# Patient Record
Sex: MALE | Race: WHITE | NOT HISPANIC OR LATINO | Employment: FULL TIME | URBAN - METROPOLITAN AREA
[De-identification: names, ages, dates, MRNs, and addresses within clinical notes are randomized per-mention and may not be internally consistent; named-entity substitution may affect disease eponyms.]

---

## 2023-05-04 ENCOUNTER — OFFICE VISIT (OUTPATIENT)
Dept: URGENT CARE | Facility: URGENT CARE | Age: 38
End: 2023-05-04
Payer: OTHER MISCELLANEOUS

## 2023-05-04 VITALS
OXYGEN SATURATION: 98 % | DIASTOLIC BLOOD PRESSURE: 78 MMHG | RESPIRATION RATE: 20 BRPM | HEART RATE: 78 BPM | SYSTOLIC BLOOD PRESSURE: 120 MMHG | TEMPERATURE: 98 F

## 2023-05-04 DIAGNOSIS — S61.011A THUMB LACERATION, RIGHT, INITIAL ENCOUNTER: Primary | ICD-10-CM

## 2023-05-04 PROCEDURE — 99203 OFFICE O/P NEW LOW 30 MIN: CPT | Performed by: FAMILY MEDICINE

## 2023-05-04 NOTE — PROGRESS NOTES
SUBJECTIVE:     Chief Complaint   Patient presents with     Laceration     WC laceration R thumb       Jay Rice is a 37 year old male who presents to the clinic with a laceration on the right thumb sustained 2 hour(s) ago.  This is a work related and accidental injury.    Mechanism of injury: deli .    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    Associated symptoms: Denies numbness, weakness, or loss of function  Last tetanus booster within 10 years: yes per pt    EXAM:   The patient appears today in alert,no apparent distress distress  VITALS: /78   Pulse 78   Temp 98  F (36.7  C)   Resp 20   SpO2 98%     Size of laceration: 1.5 centimeters  Characteristics of the laceration: bleeding- mild, clean and oval shaped missing tissue  Tendon function intact: yes  Sensation to light touch intact: yes  Pulses intact: yes  Picture included in patient's chart: no    Assessment:  Thumb laceration, right, initial encounter    PLAN:  PROCEDURE NOTE::  Wound cleaned with Shangelina-Marco A  Gel foam X1 applied with cessation of bleeding    After care instructions:  Keep wound clean and dry for the next 24-48 hours  Signs of infection discussed today  Discussed the probability of scarring  Allow gel foam to fall off on its own    Hari Faria MD  May 4, 2023 2:12 PM

## 2023-05-04 NOTE — PATIENT INSTRUCTIONS
Keep wound clean and dry for 24 hours  Allow gel foam to fall off on its own    Monitor for wound infection